# Patient Record
Sex: MALE | Race: WHITE
[De-identification: names, ages, dates, MRNs, and addresses within clinical notes are randomized per-mention and may not be internally consistent; named-entity substitution may affect disease eponyms.]

---

## 2019-04-30 ENCOUNTER — HOSPITAL ENCOUNTER (OUTPATIENT)
Dept: HOSPITAL 95 - ORSCSDS | Age: 54
Discharge: HOME | End: 2019-04-30
Attending: INTERNAL MEDICINE
Payer: COMMERCIAL

## 2019-04-30 VITALS — WEIGHT: 209.59 LBS | HEIGHT: 69.02 IN | BODY MASS INDEX: 31.04 KG/M2

## 2019-04-30 DIAGNOSIS — Z86.010: ICD-10-CM

## 2019-04-30 DIAGNOSIS — G47.33: ICD-10-CM

## 2019-04-30 DIAGNOSIS — E03.9: ICD-10-CM

## 2019-04-30 DIAGNOSIS — E11.9: ICD-10-CM

## 2019-04-30 DIAGNOSIS — K57.30: ICD-10-CM

## 2019-04-30 DIAGNOSIS — E78.5: ICD-10-CM

## 2019-04-30 DIAGNOSIS — Z79.82: ICD-10-CM

## 2019-04-30 DIAGNOSIS — Z79.84: ICD-10-CM

## 2019-04-30 DIAGNOSIS — Z12.11: Primary | ICD-10-CM

## 2019-04-30 DIAGNOSIS — K64.8: ICD-10-CM

## 2019-04-30 DIAGNOSIS — Z79.899: ICD-10-CM

## 2019-04-30 DIAGNOSIS — K63.5: ICD-10-CM

## 2019-04-30 PROCEDURE — 0DBM8ZX EXCISION OF DESCENDING COLON, VIA NATURAL OR ARTIFICIAL OPENING ENDOSCOPIC, DIAGNOSTIC: ICD-10-PCS | Performed by: INTERNAL MEDICINE

## 2019-04-30 NOTE — NUR
04/30/19 1229 Ingrid Gutierrez
PATIENT AND WIFE BOTH NOTIFIED OF SLIGHT DELAY DUE TO PRIOR CASE
RUNNING LONG.  PATIENT AND WIFE BOTH EXPRESS UNDERSTANDING AND HAVE NO
NEEDS.  CALL LIGHT ON BEDSIDE.